# Patient Record
Sex: FEMALE | Race: BLACK OR AFRICAN AMERICAN | NOT HISPANIC OR LATINO | Employment: UNEMPLOYED | URBAN - METROPOLITAN AREA
[De-identification: names, ages, dates, MRNs, and addresses within clinical notes are randomized per-mention and may not be internally consistent; named-entity substitution may affect disease eponyms.]

---

## 2023-01-26 ENCOUNTER — SOCIAL WORK (OUTPATIENT)
Dept: ADMINISTRATIVE | Facility: OTHER | Age: 23
End: 2023-01-26

## 2023-01-26 NOTE — PROGRESS NOTES
SW met with pt regarding initial OB assessment. Pt stated this is her 2nd pregnancy/1-miscarriage. Pt stated lives with her grandmother and able to perform ADL's independently. Pt stated does not work. Pt stated support system is her mother/Ylonda. Pt stated has medicaid([employer]). Pt stated does not have WIC. Pt stated not going to breastfeed. SW provide pt with information on other community resources.SW faxed and scanned pt's notification of pregnancy into epic.  No other needs identified at this time.    Coco Pagan,MSW  Pager#[phone]

## 2023-02-06 PROBLEM — Z13.0 ENCOUNTER FOR SICKLE-CELL SCREENING: Status: ACTIVE | Noted: 2023-02-06

## 2023-02-06 PROBLEM — O99.019 ANTEPARTUM ANEMIA: Status: ACTIVE | Noted: 2023-02-06

## 2023-03-15 PROBLEM — O43.112 CIRCUMVALLATE PLACENTA IN SECOND TRIMESTER: Status: ACTIVE | Noted: 2023-03-15

## 2023-04-12 PROBLEM — O09.32 LATE PRENATAL CARE AFFECTING PREGNANCY IN SECOND TRIMESTER: Status: ACTIVE | Noted: 2023-04-12

## 2023-04-20 PROBLEM — D57.3 SICKLE CELL TRAIT: Status: ACTIVE | Noted: 2023-04-20

## 2023-04-20 PROBLEM — Z13.0 ENCOUNTER FOR SICKLE-CELL SCREENING: Status: RESOLVED | Noted: 2023-02-06 | Resolved: 2023-04-20

## 2023-04-20 PROBLEM — D50.9 IRON DEFICIENCY ANEMIA: Status: ACTIVE | Noted: 2023-04-20

## 2023-07-05 ENCOUNTER — PATIENT MESSAGE (OUTPATIENT)
Dept: RESEARCH | Facility: HOSPITAL | Age: 23
End: 2023-07-05

## 2023-07-18 PROBLEM — O09.93 PREGNANCY, SUPERVISION, HIGH-RISK, THIRD TRIMESTER: Status: ACTIVE | Noted: 2023-07-18

## 2023-07-19 ENCOUNTER — PATIENT MESSAGE (OUTPATIENT)
Dept: RESEARCH | Facility: HOSPITAL | Age: 23
End: 2023-07-19

## 2023-07-24 ENCOUNTER — PATIENT MESSAGE (OUTPATIENT)
Dept: RESEARCH | Facility: HOSPITAL | Age: 23
End: 2023-07-24

## 2023-07-26 PROBLEM — O09.93 PREGNANCY, SUPERVISION, HIGH-RISK, THIRD TRIMESTER: Status: RESOLVED | Noted: 2023-07-18 | Resolved: 2023-07-26

## 2023-07-26 PROBLEM — O43.113 CIRCUMVALLATE PLACENTA IN THIRD TRIMESTER: Status: ACTIVE | Noted: 2023-03-15

## 2023-07-28 PROBLEM — R05.9 COUGH: Status: ACTIVE | Noted: 2023-07-28

## 2023-07-29 PROBLEM — O43.113 CIRCUMVALLATE PLACENTA IN THIRD TRIMESTER: Status: RESOLVED | Noted: 2023-03-15 | Resolved: 2023-07-29

## 2023-07-29 PROBLEM — O99.019 ANTEPARTUM ANEMIA: Status: RESOLVED | Noted: 2023-02-06 | Resolved: 2023-07-29

## 2023-09-06 PROBLEM — R87.612 LGSIL ON PAP SMEAR OF CERVIX: Status: ACTIVE | Noted: 2023-09-06

## 2023-09-06 PROBLEM — R05.9 COUGH: Status: RESOLVED | Noted: 2023-07-28 | Resolved: 2023-09-06

## 2023-09-06 PROBLEM — O09.32 LATE PRENATAL CARE AFFECTING PREGNANCY IN SECOND TRIMESTER: Status: RESOLVED | Noted: 2023-04-12 | Resolved: 2023-09-06

## 2024-04-09 PROBLEM — O46.90 VAGINAL BLEEDING IN PREGNANCY: Status: ACTIVE | Noted: 2024-04-09

## 2024-04-09 LAB — PAP RECOMMENDATION EXT: NORMAL

## 2024-05-09 PROBLEM — O23.42 URINARY TRACT INFECTION IN MOTHER DURING SECOND TRIMESTER OF PREGNANCY: Status: ACTIVE | Noted: 2024-05-09

## 2024-05-10 ENCOUNTER — PATIENT OUTREACH (OUTPATIENT)
Dept: ADMINISTRATIVE | Facility: HOSPITAL | Age: 24
End: 2024-05-10

## 2024-07-31 PROBLEM — R87.612 LGSIL ON PAP SMEAR OF CERVIX: Status: RESOLVED | Noted: 2023-09-06 | Resolved: 2024-07-31

## 2024-07-31 PROBLEM — R87.613 HSIL (HIGH GRADE SQUAMOUS INTRAEPITHELIAL LESION) ON PAP SMEAR OF CERVIX: Status: ACTIVE | Noted: 2024-07-31

## 2024-07-31 PROBLEM — O24.414 INSULIN CONTROLLED WHITE CLASSIFICATION A2 GESTATIONAL DIABETES MELLITUS (GDM): Status: ACTIVE | Noted: 2024-07-31

## 2024-07-31 PROBLEM — O09.92 SUPERVISION OF HIGH RISK PREGNANCY IN SECOND TRIMESTER: Status: ACTIVE | Noted: 2023-07-18

## 2024-07-31 PROBLEM — O10.919 CHRONIC HYPERTENSION IN PREGNANCY: Status: ACTIVE | Noted: 2024-07-31

## 2024-08-12 PROBLEM — O23.42 URINARY TRACT INFECTION IN MOTHER DURING SECOND TRIMESTER OF PREGNANCY: Status: RESOLVED | Noted: 2024-05-09 | Resolved: 2024-08-12

## 2024-10-30 PROBLEM — O99.213 OBESITY AFFECTING PREGNANCY IN THIRD TRIMESTER: Status: ACTIVE | Noted: 2024-10-30

## 2024-10-31 PROBLEM — O24.419 GESTATIONAL DIABETES: Status: ACTIVE | Noted: 2024-07-31

## 2024-10-31 PROBLEM — O24.414 INSULIN CONTROLLED WHITE CLASSIFICATION A2 GESTATIONAL DIABETES MELLITUS (GDM): Status: RESOLVED | Noted: 2024-07-31 | Resolved: 2024-10-31

## 2025-05-01 PROBLEM — D06.9 HIGH GRADE SQUAMOUS INTRAEPITHELIAL LESION (HGSIL), GRADE 3 CIN, ON BIOPSY OF CERVIX: Status: ACTIVE | Noted: 2025-05-01

## 2025-05-01 PROBLEM — N87.1 DYSPLASIA OF CERVIX, HIGH GRADE CIN 2: Status: ACTIVE | Noted: 2025-05-01

## 2025-07-02 PROBLEM — Z98.890 S/P LEEP: Status: ACTIVE | Noted: 2025-07-02

## 2025-08-22 DIAGNOSIS — Z84.89 FAMILY HISTORY OF GENETIC DISEASE: Primary | ICD-10-CM
